# Patient Record
Sex: MALE | Race: WHITE
[De-identification: names, ages, dates, MRNs, and addresses within clinical notes are randomized per-mention and may not be internally consistent; named-entity substitution may affect disease eponyms.]

---

## 2019-11-24 ENCOUNTER — HOSPITAL ENCOUNTER (EMERGENCY)
Dept: HOSPITAL 97 - ER | Age: 69
Discharge: HOME | End: 2019-11-24
Payer: COMMERCIAL

## 2019-11-24 VITALS — DIASTOLIC BLOOD PRESSURE: 82 MMHG | SYSTOLIC BLOOD PRESSURE: 117 MMHG | OXYGEN SATURATION: 100 % | TEMPERATURE: 98.5 F

## 2019-11-24 DIAGNOSIS — Z79.82: ICD-10-CM

## 2019-11-24 DIAGNOSIS — Z23: ICD-10-CM
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
## 2019-11-24 DIAGNOSIS — I48.91: ICD-10-CM
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
## 2019-11-24 DIAGNOSIS — S61.011A: Primary | ICD-10-CM
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
## 2019-11-24 PROCEDURE — 99283 EMERGENCY DEPT VISIT LOW MDM: CPT

## 2019-11-24 PROCEDURE — 90471 IMMUNIZATION ADMIN: CPT

## 2019-11-24 PROCEDURE — 0JQJ0ZZ REPAIR RIGHT HAND SUBCUTANEOUS TISSUE AND FASCIA, OPEN APPROACH: ICD-10-PCS

## 2019-11-24 PROCEDURE — 90714 TD VACC NO PRESV 7 YRS+ IM: CPT

## 2019-11-24 NOTE — ER
Nurse's Notes                                                                                     

 [facility]                                                                 

Name: Ganesh Diehl                                                                              

Age: 69 yrs                                                                                       

Sex: Male                                                                                         

: 1950                                                                                   

MRN: [id number]                                                                                   

Arrival Date: 2019                                                                          

Time: 13:58                                                                                       

Account#: [id number]                                                                            

Bed 24                                                                                            

Private MD:                                                                                       

Diagnosis: Laceration without foreign body of right thumb without damage to nail                  

                                                                                                  

Presentation:                                                                                     

                                                                                             

13:58 Transition of care: patient was not received from another setting of care.              sv  

13:58 Method Of Arrival: Ambulatory                                                           sv  

14:00 Presenting complaint: Patient states: right thumb injury with a machine that he was     sv  

      trying to maneuver and his skin ripped off on the right thumb. Onset of symptoms was        

      2019. Risk Assessment: Do you want to hurt yourself or someone else?           

      Patient reports no desire to harm self or others. Care prior to arrival: None.              

14:00 Acuity: CHANO 4                                                                           sv  

14:10 Initial Sepsis Screen: Does the patient meet any 2 criteria? No. Patient's initial      tr5 

      sepsis screen is negative. Does the patient have a suspected source of infection? No.       

      Patient's initial sepsis screen is negative.                                                

                                                                                                  

Historical:                                                                                       

- Allergies:                                                                                      

14:03 No Known Allergies;                                                                     sv  

- Home Meds:                                                                                      

14:03 aspirin 81 mg Oral chew [Active];                                                       sv  

- PMHx:                                                                                           

14:03 atrial flutter;                                                                         sv  

                                                                                                  

- Immunization history:: Adult Immunizations unknown.                                             

- Social history:: Smoking status: unknown.                                                       

- Ebola Screening: : Patient negative for fever greater than or equal to 101.5 degrees            

  Fahrenheit, and additional compatible Ebola Virus Disease symptoms Patient denies               

  exposure to infectious person Patient denies travel to an Ebola-affected area in the            

  21 days before illness onset No symptoms or risks identified at this time.                      

                                                                                                  

                                                                                                  

Screenin:10 Abuse screen: Denies threats or abuse. Nutritional screening: No deficits noted.        tr5 

      Tuberculosis screening: No symptoms or risk factors identified. Fall Risk None              

      identified.                                                                                 

                                                                                                  

Assessment:                                                                                       

14:32 General: Appears in no apparent distress. Behavior is calm, cooperative, appropriate    tr5 

      for age. Pain: Complains of pain in palmar aspect of distal phalanx of right thumb.         

      Neuro: Level of Consciousness is awake, alert, obeys commands, Oriented to person,          

      place, time,  are equal bilaterally Moves all extremities. Cardiovascular: Heart       

      tones present Capillary refill < 3 seconds. Respiratory: Airway is patent Respiratory       

      effort is even, unlabored, Respiratory pattern is regular, symmetrical. GI: No signs        

      and/or symptoms were reported involving the gastrointestinal system. : No signs           

      and/or symptoms were reported regarding the genitourinary system. EENT: No signs and/or     

      symptoms were reported regarding the EENT system. Derm: No signs and/or symptoms            

      reported regarding the dermatologic system. Musculoskeletal: Injury to L thumb. Injury      

      Description: Laceration.                                                                    

                                                                                                  

Vital Signs:                                                                                      

14:04  / 82; Pulse 85; Resp 20; Temp 98.5; Pulse Ox 100% ; Weight 120.2 kg; Height 6    sv  

      ft. 1 in. (185.42 cm);                                                                      

14:04 Body Mass Index 34.96 (120.20 kg, 185.42 cm)                                            sv  

                                                                                                  

ED Course:                                                                                        

13:58 Patient arrived in ED.                                                                  mr  

13:58 Arm band placed on.                                                                     sv  

14:03 Triage completed.                                                                       sv  

14:08 Kobi Olivia MD is Attending Physician.                                              kdr 

14:10 Bed in low position. Call light in reach. Side rails up X 1.                            tr5 

14:25 Derrick Berry, RN is Primary Nurse.                                                 tr5 

15:30 Assist provider with laceration repair on palmar aspect of distal phalanx of right      iw  

      thumb that was between 2.6 to 7.5 cm using sutures. Set up tray. Performed by Khris PEÑA Dressed with 4X4s, Neosporin, Patient tolerated well.                            

15:51 No provider procedures requiring assistance completed. Patient did not have IV access   tr5 

      during this emergency room visit.                                                           

                                                                                                  

Administered Medications:                                                                         

14:31 Drug: Tetanus-Diphtheria Toxoid Adult 0.5 ml {: [employer]. Exp:         tr5 

      2021. Lot #: [id number]. } Route: IM; Site: left deltoid;                                  

15:54 Follow up: Response: No adverse reaction                                                iw  

14:55 Drug: Marcaine (0.5 %) 10 ml Volume: 10 ml; Route: Infiltration;                        iw  

                                                                                                  

                                                                                                  

Outcome:                                                                                          

15:30 Discharge ordered by MD.                                                                kdr 

15:51 Discharged to home ambulatory.                                                          tr5 

15:51 Condition: stable                                                                           

15:51 Discharge instructions given to patient, family, Instructed on discharge instructions,      

      follow up and referral plans. Demonstrated understanding of instructions, follow-up         

      care.                                                                                       

15:53 Patient left the ED.                                                                    iw  

                                                                                                  

Signatures:                                                                                       

Mary Ann Jackson RN                    RN   sv                                                   

Kobi Olivia MD MD   [facility]                                                  

[facility]                                 mr                                                   

Deedee Marquez RN RN   iw                                                   

Derrick Berry RN                   RN   tr5                                                  

                                                                                                  

Corrections: (The following items were deleted from the chart)                                    

14:05 14:04 Pulse 85bpm; Resp 20bpm; Pulse Ox 100%; Temp 98.5F; 120.2 kg; Height 6 ft. 1 in.; sv  

      BMI: 34.9; sv                                                                               

                                                                                                  

**************************************************************************************************

## 2019-11-24 NOTE — EDPHYS
Physician Documentation                                                                           

 [facility]                                                                 

Name: Ganesh Diehl                                                                              

Age: 69 yrs                                                                                       

Sex: Male                                                                                         

: 1950                                                                                   

MRN: [id number]                                                                                   

Arrival Date: 2019                                                                          

Time: 13:58                                                                                       

Account#: [id number]                                                                            

Bed 24                                                                                            

Private MD:                                                                                       

ED Physician Kobi Olivia                                                                       

HPI:                                                                                              

                                                                                             

17:50 This 69 yrs old  Male presents to ER via Ambulatory with complaints of Thumb   kdr 

      Injury.                                                                                     

17:50 The patient or guardian reports injury, a laceration, irregular, complex, flap, pain.   kdr 

      The complaints affect the right side which is dominant, IP of right thumb. Context: The     

      problem was sustained at home, resulted from a crush injury, by construction equipment,     

      Pinched - creating a flap that starts at the lateral nail gutter and extends to the         

      volar andrade surface of the thumb pad. Onset: The symptoms/episode began/occurred           

      suddenly, just prior to arrival. Modifying factors: The symptoms are alleviated by          

      nothing, the symptoms are aggravated by movement. Associated signs and symptoms: The        

      patient has no apparent associated signs or symptoms. Severity of symptoms: At their        

      worst the symptoms were mild, in the emergency department the symptoms are unchanged.       

      The patient has not experienced similar symptoms in the past. The patient has not           

      recently seen a physician.                                                                  

                                                                                                  

Historical:                                                                                       

- Allergies:                                                                                      

14:03 No Known Allergies;                                                                     sv  

- Home Meds:                                                                                      

14:03 aspirin 81 mg Oral chew [Active];                                                       sv  

- PMHx:                                                                                           

14:03 atrial flutter;                                                                         sv  

                                                                                                  

- Immunization history:: Adult Immunizations unknown.                                             

- Social history:: Smoking status: unknown.                                                       

- Ebola Screening: : Patient negative for fever greater than or equal to 101.5 degrees            

  Fahrenheit, and additional compatible Ebola Virus Disease symptoms Patient denies               

  exposure to infectious person Patient denies travel to an Ebola-affected area in the            

  21 days before illness onset No symptoms or risks identified at this time.                      

                                                                                                  

                                                                                                  

ROS:                                                                                              

17:50 Constitutional: Negative for fever, chills, and weight loss, Eyes: Negative for injury, kdr 

      pain, redness, and discharge, Neck: Negative for injury, pain, and swelling,                

      Cardiovascular: Negative for chest pain, palpitations, and edema, Respiratory: Negative     

      for shortness of breath, cough, wheezing, and pleuritic chest pain.                         

17:50 Skin: Positive for laceration(s).                                                           

                                                                                                  

Exam:                                                                                             

17:50 Constitutional:  This is a well developed, well nourished patient who is awake, alert,  kdr 

      and in no acute distress.                                                                   

17:50 Skin: injury, laceration(s), that can be described as jagged, There is a quater sized       

      flap to the index finger side of the thumb that extends down from the gutter of the         

      nail to the andrade surface of the thumb pad.                                                

                                                                                                  

Vital Signs:                                                                                      

14:04  / 82; Pulse 85; Resp 20; Temp 98.5; Pulse Ox 100% ; Weight 120.2 kg; Height 6    sv  

      ft. 1 in. (185.42 cm);                                                                      

14:04 Body Mass Index 34.96 (120.20 kg, 185.42 cm)                                            sv  

                                                                                                  

Laceration:                                                                                       

15:30 Wound Repair of 4cm ( 1.6in ) subcutaneous laceration to palmar aspect of distal        jmm 

      phalanx of right thumb. Distal neuro/vascular/tendon intact. Anesthesia: Digital block      

      administered with 5 mls of 0.5% marcaine. Wound prep: Moderate cleansing with betadine      

      by me. Skin closed with 9 5-0 Prolene using simple sutures and sterile technique.           

      Patient tolerated well.                                                                     

                                                                                                  

MDM:                                                                                              

15:30 Patient medically screened.                                                             kdr 

17:50 Data reviewed: vital signs, nurses notes. Counseling: I had a detailed discussion with  kdr 

      the patient and/or guardian regarding: the historical points, exam findings, and any        

      diagnostic results supporting the discharge/admit diagnosis, the need for outpatient        

      follow up.                                                                                  

                                                                                                  

Administered Medications:                                                                         

14:31 Drug: Tetanus-Diphtheria Toxoid Adult 0.5 ml {: [employer]. Exp:         tr5 

      2021. Lot #: [id number]. } Route: IM; Site: left deltoid;                                  

15:54 Follow up: Response: No adverse reaction                                                iw  

14:55 Drug: Marcaine (0.5 %) 10 ml Volume: 10 ml; Route: Infiltration;                        iw  

                                                                                                  

                                                                                                  

Disposition:                                                                                      

17:50 Co-signature as Attending Physician, Kobi Olivia MD I agree with the assessment and   kdr 

      plan of care.                                                                               

                                                                                                  

Disposition:                                                                                      

19 15:30 Discharged to Home. Impression: Laceration without foreign body of right thumb     

  without damage to nail.                                                                         

- Condition is Stable.                                                                            

- Discharge Instructions: VIS, Diphtheria, Tetanus, and Pertussis (DTaP) - CDC,                   

  Laceration Care, Adult, Easy-to-Read, Sutured Wound Care, Easy-to-Read.                         

- Prescriptions for Keflex 500 mg Oral Capsule - take 1 capsule by ORAL route every 8             

  hours for 3 days; 6 capsule. Tramadol 50 mg Oral Tablet - take 1 tablet by ORAL route           

  every 8 hours as needed; 12 tablet.                                                             

- Medication Reconciliation Form, Thank You Letter, Antibiotic Education, Prescription            

  Opioid Use form.                                                                                

- Follow up: Private Physician; When: 2 - 3 days; Reason: Wound Recheck, If symptoms              

  return, Further diagnostic work-up, Recheck today's complaints, Continuance of care,            

  Re-evaluation by your physician.                                                                

- Problem is new.                                                                                 

- Symptoms have improved.                                                                         

- Notes: Sutures should be taken out in 10 - 12 days. You had nine sutures plance in              

  medial aspect of the right distal thumb. You also received a tetanus update.                    

                                                                                                  

                                                                                                  

Signatures:                                                                                       

Mary Ann Jackson, RN                    RN   Kobi Oswald MD MD kdr Mickail, Joel, PA PA jmm Williams, Irene, RN RN   iw                                                   

Derrick Berry RN                   RN   tr5                                                  

                                                                                                  

Corrections: (The following items were deleted from the chart)                                    

15:53 15:30 2019 15:30 Discharged to Home. Impression: Laceration without foreign body  iw  

      of right thumb without damage to nail. Condition is Stable. Forms are Medication            

      Reconciliation Form, Thank You Letter, Antibiotic Education, Prescription Opioid Use.       

      Follow up: Private Physician; When: 2 - 3 days; Reason: Wound Recheck, If symptoms          

      return, Further diagnostic work-up, Recheck today's complaints, Continuance of care,        

      Re-evaluation by your physician. Problem is new. Symptoms have improved. kdr                

                                                                                                  

**************************************************************************************************

## 2019-12-23 ENCOUNTER — HOSPITAL ENCOUNTER (EMERGENCY)
Dept: HOSPITAL 97 - ER | Age: 69
Discharge: HOME | End: 2019-12-23
Payer: COMMERCIAL

## 2019-12-23 VITALS — DIASTOLIC BLOOD PRESSURE: 100 MMHG | SYSTOLIC BLOOD PRESSURE: 165 MMHG | OXYGEN SATURATION: 97 % | TEMPERATURE: 97.4 F

## 2019-12-23 DIAGNOSIS — Z98.890: ICD-10-CM

## 2019-12-23 DIAGNOSIS — I48.91: ICD-10-CM

## 2019-12-23 DIAGNOSIS — N30.01: Primary | ICD-10-CM

## 2019-12-23 PROCEDURE — 99284 EMERGENCY DEPT VISIT MOD MDM: CPT

## 2019-12-23 PROCEDURE — 81003 URINALYSIS AUTO W/O SCOPE: CPT

## 2019-12-23 PROCEDURE — 51702 INSERT TEMP BLADDER CATH: CPT

## 2019-12-23 NOTE — EDPHYS
Physician Documentation                                                                           

 [facility]                                                                 

Name: Ganesh Diehl                                                                              

Age: 69 yrs                                                                                       

Sex: Male                                                                                         

: 1950                                                                                   

MRN: [id number]                                                                                   

Arrival Date: 2019                                                                          

Time: 04:36                                                                                       

Account#: [id number]                                                                            

Bed 7                                                                                             

Private MD:                                                                                       

ED Physician Duane Vargas                                                                      

HPI:                                                                                              

                                                                                             

05:25 This 69 yrs old  Male presents to ER via Ambulatory with complaints of Urinary ps1 

      Retention.                                                                                  

05:25 patient s/p urethral lift procedure in [place]. Patient has had normal course of        ps1 

      healing for last couple of days. Patient states that he has not been able to urinate        

      since yesterday afternoon and now has pain in lower abdomen. Pain is moderate. No           

      fever. .                                                                                    

                                                                                                  

Historical:                                                                                       

- Allergies:                                                                                      

04:51 No Known Allergies;                                                                     ea  

- Home Meds:                                                                                      

04:51 metoprolol tartrate Oral [Active];                                                      ea  

- PMHx:                                                                                           

04:51 atrial flutter;                                                                         ea  

- PSHx:                                                                                           

04:51 None;                                                                                   ea  

                                                                                                  

- Immunization history:: Adult Immunizations up to date.                                          

- Social history:: Smoking status: Patient/guardian denies using tobacco.                         

- Ebola Screening: : No symptoms or risks identified at this time.                                

                                                                                                  

                                                                                                  

ROS:                                                                                              

05:25 Constitutional: Negative for fever, chills, and weight loss, Eyes: Negative for injury, ps1 

      pain, redness, and discharge, Cardiovascular: Negative for chest pain, palpitations,        

      and edema, Respiratory: Negative for shortness of breath, cough, wheezing, and              

      pleuritic chest pain, Abdomen/GI: Negative for abdominal pain, nausea, vomiting,            

      diarrhea, and constipation, MS/Extremity: Negative for injury and deformity, Skin:          

      Negative for injury, rash, and discoloration, Neuro: Negative for headache, weakness,       

      numbness, tingling, and seizure.                                                            

05:25 : Positive for small amounts, hematuria.                                                  

                                                                                                  

Exam:                                                                                             

05:25 Constitutional:  This is a well developed, well nourished patient who is awake, alert,  ps1 

      and in no acute distress. Head/Face:  Normocephalic, atraumatic. Eyes:  Pupils equal        

      round and reactive to light, extra-ocular motions intact.  Lids and lashes normal.          

      Conjunctiva and sclera are non-icteric and not injected. Chest/axilla:  Normal chest        

      wall appearance and motion.  Nontender with no deformity.  No lesions are appreciated.      

      Cardiovascular:  Regular rate and rhythm.  No gallops, murmurs, or rubs.  Normal PMI,       

      no JVD.  No pulse deficits. Respiratory:  Lungs have equal breath sounds bilaterally,       

      clear to auscultation and percussion.  No rales, rhonchi or wheezes noted.  No              

      increased work of breathing, no retractions or nasal flaring. Abdomen/GI:  Soft,            

      non-tender, with normal bowel sounds.  No distension or tympany.  No guarding or            

      rebound.  No evidence of tenderness throughout. Skin:  Warm, dry with normal turgor.        

      Normal color with no rashes, no lesions, and no evidence of cellulitis. MS/ Extremity:      

      Pulses equal, no cyanosis.  Neurovascular intact.  Full, normal range of motion.            

05:25 : CVA tenderness, is absent, Bladder: distension, that is severe, a abraham is noted,       

      urine is blood tinged, clots, Placed after bladder scan 940mL.                              

                                                                                                  

Vital Signs:                                                                                      

04:48  / 100; Pulse 72; Resp 18; Temp 97.4; Pulse Ox 97% on R/A; Weight 120.2 kg;       ea  

      Height 6 ft. 1 in. (185.42 cm);                                                             

04:48 Body Mass Index 34.96 (120.20 kg, 185.42 cm)                                            ea  

                                                                                                  

MDM:                                                                                              

04:54 Patient medically screened.                                                             ps1 

05:34 Data reviewed: vital signs, nurses notes, lab test result(s), and as a result, I will   ps1 

      discharge patient. Counseling: I had a detailed discussion with the patient and/or          

      guardian regarding: the historical points, exam findings, and any diagnostic results        

      supporting the discharge/admit diagnosis, lab results, the need for outpatient follow       

      up, a urologist, to return to the emergency department if symptoms worsen or persist or     

      if there are any questions or concerns that arise at home.                                  

                                                                                                  

                                                                                             

05:31 Order name: Urine Dipstick--Ancillary (enter results)                                   mw2 

                                                                                             

05:03 Order name: Abraham; Complete Time: 05:03                                                 ao  

                                                                                             

05:25 Order name: Urine Dipstick-Ancillary (obtain specimen); Complete Time: 05:46            ps1 

                                                                                                  

Administered Medications:                                                                         

No medications were administered                                                                  

                                                                                                  

                                                                                                  

Disposition:                                                                                      

19 05:34 Discharged to Home. Impression: Acute cystitis with hematuria, Acute urinary       

  retention.                                                                                      

- Condition is Stable.                                                                            

- Discharge Instructions: Abraham Catheter Care, Adult, Urinary Tract Infection, Adult.             

- Prescriptions for Keflex 500 mg Oral Capsule - take 1 capsule by ORAL route every 8             

  hours for 10 days; 30 capsule.                                                                  

- Medication Reconciliation Form, Thank You Letter, Antibiotic Education, Prescription            

  Opioid Use form.                                                                                

- Follow up: Private Physician; When: Upon discharge from the Emergency Department;               

  Reason: Further diagnostic work-up, Recheck today's complaints, Continuance of care,            

  Re-evaluation by your physician. Follow up: Emergency Department; When: As needed;              

  Reason: Fever > 102 F, Worsening of condition.                                                  

                                                                                                  

                                                                                                  

                                                                                                  

Signatures:                                                                                       

Dispatcher [employer]                           EDMS                                                 

Félix Lainez RN                         RN   Sasha Lancaster RN                      RN   Duane Ivey MD MD   ps1                                                  

                                                                                                  

Corrections: (The following items were deleted from the chart)                                    

05:47 05:34 2019 05:34 Discharged to Home. Impression: Acute cystitis with hematuria;   ea  

      Acute urinary retention. Condition is Stable. Forms are Medication Reconciliation Form,     

      Thank You Letter, Antibiotic Education, Prescription Opioid Use. Follow up: Private         

      Physician; When: Upon discharge from the Emergency Department; Reason: Further              

      diagnostic work-up, Recheck today's complaints, Continuance of care, Re-evaluation by       

      your physician. Follow up: Emergency Department; When: As needed; Reason: Fever > 102       

      F, Worsening of condition. ps1                                                              

                                                                                                  

**************************************************************************************************

## 2019-12-23 NOTE — ER
Nurse's Notes                                                                                     

 [facility]                                                                 

Name: Ganesh Diehl                                                                              

Age: 69 yrs                                                                                       

Sex: Male                                                                                         

: 1950                                                                                   

MRN: [id number]                                                                                   

Arrival Date: 2019                                                                          

Time: 04:36                                                                                       

Account#: [id number]                                                                            

Bed 7                                                                                             

Private MD:                                                                                       

Diagnosis: Acute cystitis with hematuria;Acute urinary retention                                  

                                                                                                  

Presentation:                                                                                     

                                                                                             

04:46 Presenting complaint: Patient states: Reports he is unable to urinate since 4 PM        ea  

      yesterday, states "Wednesday I had a ureter lift, everything went fine and yesterday I      

      couldn't urinate" Pt reports bright red blood coming from urethra. Transition of care:      

      patient was not received from another setting of care. Onset of symptoms was 2019. Risk Assessment: Do you want to hurt yourself or someone else? Patient            

      reports no desire to harm self or others. Initial Sepsis Screen: Does the patient meet      

      any 2 criteria? No. Patient's initial sepsis screen is negative. Does the patient have      

      a suspected source of infection? No. Patient's initial sepsis screen is negative. Care      

      prior to arrival: None.                                                                     

04:46 Method Of Arrival: Ambulatory                                                           ea  

04:46 Acuity: CHANO 4                                                                           ea  

                                                                                                  

Triage Assessment:                                                                                

04:51 General: Appears in no apparent distress. Behavior is cooperative, restless. Pain:      ea  

      Denies pain. Neuro: Level of Consciousness is awake, alert, obeys commands, Oriented to     

      person, place, time, situation. Cardiovascular: Patient's skin is warm and dry.             

      Respiratory: Airway is patent Respiratory effort is even, unlabored, Respiratory            

      pattern is regular, symmetrical. Derm: Skin is pink, warm \T\ dry.                          

                                                                                                  

Historical:                                                                                       

- Allergies:                                                                                      

04:51 No Known Allergies;                                                                     ea  

- Home Meds:                                                                                      

04:51 metoprolol tartrate Oral [Active];                                                      ea  

- PMHx:                                                                                           

04:51 atrial flutter;                                                                         ea  

- PSHx:                                                                                           

04:51 None;                                                                                   ea  

                                                                                                  

- Immunization history:: Adult Immunizations up to date.                                          

- Social history:: Smoking status: Patient/guardian denies using tobacco.                         

- Ebola Screening: : No symptoms or risks identified at this time.                                

                                                                                                  

                                                                                                  

Screenin:49 Abuse screen: Denies threats or abuse. Nutritional screening: No deficits noted.        ea  

      Tuberculosis screening: No symptoms or risk factors identified. Fall Risk None              

      identified.                                                                                 

                                                                                                  

Assessment:                                                                                       

04:50 General: Appears in no apparent distress. uncomfortable, obese, Behavior is calm,       ao  

      cooperative, appropriate for age. Pain: Complains of pain in abdomen. Neuro: Level of       

      Consciousness is awake, alert, obeys commands, Oriented to person, place, time,             

      situation, Appropriate for age Moves all extremities. Full function Gait is steady,         

      Speech is normal. Cardiovascular: Capillary refill < 3 seconds Patient's skin is warm       

      and dry. Respiratory: Airway is patent Respiratory effort is even, unlabored,               

      Respiratory pattern is regular, symmetrical. GI: No signs and/or symptoms were reported     

      involving the gastrointestinal system. : Reports inability to void, since 12 PTA pain     

      in suprapubic area. EENT: No signs and/or symptoms were reported regarding the EENT         

      system. Derm: No signs and/or symptoms reported regarding the dermatologic system.          

      Musculoskeletal: No signs and/or symptoms reported regarding the musculoskeletal system.    

05:46 Reassessment: Patient and/or family updated on plan of care and expected duration. Pain ea  

      level reassessed. Patient is alert, oriented x 3, equal unlabored respirations, skin        

      warm/dry/pink. Discharge instruction given to patient, verbalized the understanding of      

      instruction. Pt left ED ambulatory accompanied by family. Pt tolerating well.               

                                                                                                  

Vital Signs:                                                                                      

04:48  / 100; Pulse 72; Resp 18; Temp 97.4; Pulse Ox 97% on R/A; Weight 120.2 kg;       ea  

      Height 6 ft. 1 in. (185.42 cm);                                                             

04:48 Body Mass Index 34.96 (120.20 kg, 185.42 cm)                                            ea  

                                                                                                  

ED Course:                                                                                        

04:36 Patient arrived in ED.                                                                  ag3 

04:43 Duane Vargas MD is Attending Physician.                                             ps1 

04:45 Sasha Rodrigues, RN is Primary Nurse.                                                    ea  

04:48 Triage completed.                                                                       ea  

04:49 Patient has correct armband on for positive identification. Bed in low position. Call   ea  

      light in reach. Side rails up X2.                                                           

04:49 Arm band placed on right wrist. Patient placed in an exam room, on a stretcher, on      ea  

      pulse oximetry.                                                                             

05:03 Pratt cath inserted, using sterile technique, 16 Fr., by me, balloon inflated, to       ao  

      gravity drainage, clamped. other Patient output 1000. Pratt Clamped.                        

05:46 No provider procedures requiring assistance completed. Patient did not have IV access   ea  

      during this emergency room visit.                                                           

                                                                                                  

Administered Medications:                                                                         

No medications were administered                                                                  

                                                                                                  

                                                                                                  

Outcome:                                                                                          

05:34 Discharge ordered by MD.                                                                ps1 

05:47 Discharged to home ambulatory, with family.                                             ea  

05:47 Condition: stable                                                                           

05:47 Discharge instructions given to patient, Instructed on discharge instructions, follow       

      up and referral plans. medication usage, Demonstrated understanding of instructions,        

      follow-up care, medications, Prescriptions given X 1.                                       

05:47 Patient left the ED.                                                                    ea  

                                                                                                  

Signatures:                                                                                       

Félix Lainez RN                         Sasha Merrill RN RN ea Singer, Phillip, MD MD   ps1                                                  

Last, Elvi                                 ag3                                                  

                                                                                                  

Corrections: (The following items were deleted from the chart)                                    

04:52 04:51 General: Appears in no apparent distress. Behavior is calm, cooperative,          ea  

      appropriate for age, ea                                                                     

                                                                                                  

**************************************************************************************************

## 2019-12-23 NOTE — XMS REPORT
Summary of Care

 Created on:2019



Patient:Ganesh Diehl

Sex:Male

:1950

External Reference #:[id number]





Demographics







 Address  [address] [place] [address] 



   [place] [address]

 

 Mobile Phone  [phone]

 

 Home Phone  [phone]

 

 Preferred Language  English

 

 Marital Status  

 

 Sikhism Affiliation  Unknown

 

 Race  White

 

 Ethnic Group  Not  or 









Author







 Organization  [facility]

 

 Address  [address] [id number]









Support







 Name  Relationship  Address  Phone

 

 Alessandro Diehl  Extended family member  [address] [place] [address] [address]  [phone]



     [place] [address]  









Care Team Providers







 Name  Role  Phone

 

 Unavailable  Primary Care Provider  Unavailable









Reason for Visit







 Reason  Comments

 

 Medical Concern  







Encounter Details







 Date  Type  Department  Care Team  Description

 

 2019  Office Visit  [facility]  Leandro Porter MD



  Medical Concern



     Medicine Urology



  [address] [place] [address]



  



     [address] [place] [address]



  [address], [address]



  



     10th Floor, [address]



  [place] [address]



  



     [place] [address] [phone] (Fax)  







Allergies

No Known Allergiesdocumented as of this encounter (statuses as of 2019)



Medications







 Medication  Sig  Dispensed  Refills  Start Date  End Date  Status

 

 ASPIRIN 81 OR  aspirin 81 mg tablet,delayed release    0      Active



    Take 1 tablet every day by oral route.          

 

 Multiple  Centrum Silver Ultra Men's 300 mcg-600 mcg-300 mcg tablet    0      
Active



 Vitamins-Minerals   Take 1 tablet every day by oral route.          



 (CENTRUM SILVER            



 50+MEN OR)            

 

 metoprolol  TAKE 1 TABLET BY    0  2019    Active



 (TOPROL-XL) 25 MG XL  MOUTH ONCE DAILY          



 tablet            

 

 terazosin (HYTRIN) 5  terazosin 5 mg    0      Active



 MG capsule  capsule          

 

 B Complex Vitamins (B  Take  by mouth.    0      Active



 COMPLEX 100 OR)            

 

 Ascorbic Acid  Take  by mouth.    0      Active



 (VITAMIN C) 500 MG            



 CAPS            



documented as of this encounter (statuses as of 2019)



Active Problems

Not on filedocumented as of this encounter (statuses as of 2019)



Social History







 Tobacco Use  Types  Packs/Day  Years Used  Date

 

 Never Smoker        









 Smokeless Tobacco: Never Used      









 Alcohol Use  Drinks/Week  oz/Week  Comments

 

 Not Currently      









 Sex Assigned at Birth  Date Recorded

 

 Not on file  









 Job Start Date  Occupation  Industry

 

 Not on file  Not on file  Not on file









 Travel History  Travel Start  Travel End









 No recent travel history available.



documented as of this encounter



Last Filed Vital Signs







 Vital Sign  Reading  Time Taken  Comments

 

 Blood Pressure  122/85  2019 11:42 AM CDT  

 

 Pulse  63  2019 11:42 AM CDT  

 

 Temperature  36.6 C (97.8 F)  2019 11:42 AM CDT  

 

 Respiratory Rate  -  -  

 

 Oxygen Saturation  -  -  

 

 Inhaled Oxygen Concentration  -  -  

 

 Weight  126.1 kg (278 lb)  2019 11:42 AM CDT  

 

 Height  185.4 cm (6' 1")  2019 11:42 AM CDT  

 

 Body Mass Index  36.68  2019 11:42 AM CDT  



documented in this encounter



Progress Notes

Leandro Porter MD - 2019 12:00 PM CDTFormatting of this note might be 
different from the original.

Referring Physician

Self Referral

No address on file



Patient Name: Ganesh Diehl

:1950

[facility] ID#:[id number]



Mr. Diehl is a 69 y.o. year old male who present to me for BPH and lower 
urinary tract symptoms for the past 5 years.



He is currently  being treated for his BPH.  He is taking Terazosin

He has not had any prior procedures to treat his BPH.

  He has no family history of prostate cancer.



 denies hematuria, dysuria.



He has urinary urgency or frequency and states has a decreased urinary stream.  
He has nocturia x4.



Past Medical History:

Diagnosis Date

 Family history of prostate problems

 Irregular heart beat





Past Surgical History:

Procedure Laterality Date

 HX VENTRICULAR ABLATION SURGERY





Medications:



Current Outpatient Medications:

  Ascorbic Acid (VITAMIN C) 500 MG CAPS, Take  by mouth., Disp: , Rfl:

  ASPIRIN 81 OR, aspirin 81 mg tablet,delayed release  Take 1 tablet every 
day by oral route., Disp: , Rfl:

  B Complex Vitamins (B COMPLEX 100 OR), Take  by mouth., Disp: , Rfl:

  metoprolol (TOPROL-XL) 25 MG XL tablet, TAKE 1 TABLET BY MOUTH ONCE DAILY, 
Disp: , Rfl: 0

  Multiple Vitamins-Minerals (CENTRUM SILVER 50+MEN OR), Centrum Silver 
Ultra Men's 300 mcg-600 mcg-300 mcg tablet  Take 1 tablet every day by oral 
route., Disp: , Rfl:

  terazosin (HYTRIN) 5 MG capsule, terazosin 5 mg capsule, Disp: , Rfl:

Outpatient Medications Prior to Visit

Medication Sig Dispense Refill

 Vitamin C Take  by mouth.

 ASPIRIN 81 OR aspirin 81 mg tablet,delayed release

 Take 1 tablet every day by oral route.

 B Complex Vitamins (B COMPLEX 100 OR) Take  by mouth.

 metoprolol TAKE 1 TABLET BY MOUTH ONCE DAILY  0

 Multiple Vitamins-Minerals (CENTRUM SILVER 50+MEN OR) Centrum Silver Ultra 
Men's 300 mcg-600 mcg-300 mcg tablet

 Take 1 tablet every day by oral route.

 terazosin terazosin 5 mg capsule



No facility-administered medications prior to visit.





Social History

  Socioeconomic History

    Marital status: 

    Spouse name: Not on file

    Number of children: Not on file

    Years of education: Not on file

    Highest education level: Not on file

  Occupational History

    Not on file

  Social Needs

    Financial resource strain: Not on file

    Food insecurity:

      Worry: Not on file

      Inability: Not on file

    Transportation needs:

      Medical: Not on file

      Non-medical: Not on file

  Tobacco Use

    Smoking status: Never Smoker

    Smokeless tobacco: Never Used

  Substance and Sexual Activity

    Alcohol use: Not Currently

    Drug use: Never

    Sexual activity: Yes

  Lifestyle

    Physical activity:

      Days per week: Not on file

      Minutes per session: Not on file

    Stress: Not on file

  Relationships

    Social connections:

      Talks on phone: Not on file

      Gets together: Not on file

      Attends Yarsanism service: Not on file

      Active member of club or organization: Not on file

      Attends meetings of clubs or organizations: Not on file

      Relationship status: Not on file

    Intimate partner violence:

      Fear of current or ex partner: Not on file

      Emotionally abused: Not on file

      Physically abused: Not on file

      Forced sexual activity: Not on file

  Other Topics

    Concerns:

      Not on file

  Social History Narrative

    Not on file





family history is not on file.



No Known Allergies



Review of Systems:



GENERAL:

Denies recent weight changes, fever



INTEGUMENTARY:

Denies rashes, eruptions, dryness, jaundice, changes in skin, hair, or nails, 
discoloration of skin.



EYES:

Denies blurred vision, double vision, pain



EARS, NOSE, MOUTH AND THROAT:

Denies soreness and/or redness of gums, hoarseness, difficulty in swallowing, 
head colds, discharges, obstruction, postnasal drip, sinus pain, earaches.



MUSCULOSKELATAL:

Denies joint pain, neck pain, back pain



RESPIRATORY:

Denies chest pain, wheezing, cough, difficulty breathing, asthma, bronchitis, 
pneumonia, tuberculosis, shortness of breath, emphysema



NEUROLOGIC:

Denies fainting, blackouts, seizures, paralysis, tingling, tremors, memory loss
, dizzy spells, stroke



CARDIOVASCULAR:

CAD



ENDOCRINE:

Denies thyroid trouble, heat and cold intolerance, excessive sweating, thirst, 
hunger



GASTROINTESTINAL:

Denies nausea, vomiting, diarrhea, constipation, indigestion, food intolerance, 
hemorrhoids, jaundice, heartburn, diabetes, hepatitis



GENITOURINARY:

As per HPI



HEMATOLOGIC/LYMPHATIC:

Denies anemia, easy bruising or bleeding, past transfusion, swollen glands, 
blood clotting problems



PSYCHOLOGIC:

Denies nervousness, mood swings, insomnia, headaches, nightmares, depression



ALLERGY/IMMUNOLOGIC:

Denies food allergies, plant allergies, environmental allergies



OTHER:

Denies HIV/AIDS



Physical Exam:



Vitals:

 19 1142

BP: 122/85

Pulse: 63

Temp: 97.8 F (36.6 C)

Weight: 278 lb (126.1 kg)

Height: 6' 1" (1.854 m)



GENERAL:

    Well developed, well nourished, in no acute distress

HEAD:

    Normocephalic and atraumatic



RECTAL:

    Normal external exam  40 gram prostate without asymmetry, nodules, or 
induration, no tenderness noted, good anal sphincter tone



EXTREMITIES:

    No clubbing, cyanosis, edema, or deformity

SKIN:

    Intact without lesions or rashes

NEURO:

    GARCIA well.  Patient alert and oriented x3.

PSYCH:

    Alert and cooperative; normal mood and affect; normal attention span and 
concentration







Assessment:

BPH

LUTS



Plan:

I discussed conservative management of LUTS with lifestyle modification and 
decreasing fluid intake after 6pm.  I also discussed the natural progression of 
BPH and possible treatment strategies.



Post void residual

Flow rate

ua with micro

I have given him a list of bladder irritating foods to avoid



Uroxatral rx



I discussed the side effects of the medications and I encouraged him to read 
the package insert and patient information of the medication.



FU in 2 months for TRUS and cysto for urolift work up



Electronically signed by Leandro Porter MD at 2019 12:31 PM CDTLeandro Porter MD - 2019 12:00 PM CDTElectronically signed by Leandro Porter MD at  12:31 PM CDTBMary Ann price - 2019 11:49 AM CDT

HPI



Review of Systems

Constitutional: Negative.

HENT: Negative.

Eyes: Negative.

Respiratory: Negative.

Cardiovascular: Negative.

Gastrointestinal: Negative.

Endocrine: Negative.

Genitourinary: Positive for decreased urine volume.

Musculoskeletal: Negative.

Skin: Negative.

Allergic/Immunologic: Negative.

Neurological: Negative.

Hematological: Negative.

Psychiatric/Behavioral: Negative.

All other systems reviewed and are negative.



Physical Exam



Electronically signed by Mary Ann Goyal at 2019 12:31 PM 
CDTdocumented in this encounter



Plan of Treatment







 Health Maintenance  Due Date  Last Done  Comments

 

 COLON CANCER SCREENING: COLONOSCOPY  1950    

 

 MEDICARE AWV  1950    

 

 TETANUS SHOT (ADULT)  1964    

 

 HEPATITIS C SCREENING  1967    

 

 FALL SCREEN  2014    

 

 PNEUMOVAX >=65 (PPSV23)  2014    

 

 PREVNAR >=65 (PCV13)  2014    

 

 FLU VACCINE > 6 MONTHS  2019    



documented as of this encounter



Results

Not on filedocumented in this encounter



Visit Diagnoses







 Diagnosis

 

 Benign prostatic hyperplasia with lower urinary tract symptoms, symptom details



 unspecified - Primary



documented in this encounter



Insurance







 Payer  Benefit Plan /  Subscriber ID  Effective  Phone  Address  Type



   Group    Dates      

 

 MEDICARE  MEDICARE PART  xxxxxxxxxx  2018-Prese    [address]  Medicare



   [address] -    [id number]    [id number]



  



   [place]  



           [address]  

 

 [facility]  xxxxxxxxxx  Effective for  [phone]  [address]  
Medicare



   - [facility] SUPP    all dates  [phone]  [phone]



  



           [place]  



           [address]  









 Guarantor Name  Account Type  Relation to  Date of  Phone  Billing



     Patient  Birth    Address

 

 Ganesh Diehl  Personal/Family  Self  1950  [phone]  [address] [place] [address]        (Home)  [address] [place] [address]



           [id number]



documented as of this encounter

## 2019-12-23 NOTE — XMS REPORT
Patient Summary Document

 Created on:2019



Patient:AUREA MALDONADO

Sex:Male

:1950

External Reference #:[id number]





Demographics







 Address  [address] [place] [address] [address]



   [place] [address]

 

 Home Phone  [phone]

 

 Email Address  NONE

 

 Preferred Language  Unknown

 

 Marital Status  Unknown

 

 Islam Affiliation  Unknown

 

 Race  Unknown

 

 Additional Race(s)  Unavailable

 

 Ethnic Group  Unknown









Author







 Organization  [facility]

 

 Address  [address] [place] [address] [address]



   [place] [address]

 

 Phone  [phone]









Care Team Providers







 Name  Role  Phone

 

 ANNETTE MEADOWS  Unavailable  Unavailable









Problems

This patient has no known problems.



Allergies, Adverse Reactions, Alerts

This patient has no known allergies or adverse reactions.



Medications

This patient has no known medications.



Results







 Test Description  Test Time  Test Comments  Text Results  Atomic Results  
Result Comments









 BASIC METABOLIC PANEL  2018 10:09:00    









   Test Item  Value  Reference Range  Comments









 SODIUM (BEAKER) (test  141 meq/L  136-145  



 hder=801)      

 

 POTASSIUM (BEAKER) (test  4.5 meq/L  3.5-5.1  



 code=379)      

 

 CHLORIDE (BEAKER) (test  108 meq/L    



 code=382)      

 

 CO2 (BEAKER) (test code=355)  23 meq/L  22-29  

 

 BLOOD UREA NITROGEN (BEAKER)  18 mg/dL  7-21  



 (test code=354)      

 

 CREATININE (BEAKER) (test  0.85 mg/dL  0.57-1.25  



 code=358)      

 

 GLUCOSE RANDOM (BEAKER)  107 mg/dL    



 (test code=652)      

 

 CALCIUM (BEAKER) (test  9.4 mg/dL  8.4-10.2  



 code=697)      

 

 EGFR (BEAKER) (test  89 mL/min/1.73 sq m    ESTIMATED GFR IS NOT AS



 code=1092)      ACCURATE AS CREATININE



       CLEARANCE IN PREDICTING



       GLOMERULAR FILTRATION RATE.



       ESTIMATED GFR IS NOT



       APPLICABLE FOR DIALYSIS



       PATIENTS.



PROTHROMBIN TIME/[id number] 10:02:00





 Test Item  Value  Reference Range  Comments

 

 PROTIME (BEAKER) (test code=759)  14.4 seconds  11.7-14.7  

 

 INR (BEAKER) (test code=370)  1.1  <=5.9  



RECOMMENDED COUMADIN/WARFARIN INR THERAPY RANGESSTANDARD DOSE: 2.0 - 3.0   
Includes: PROPHYLAXIS forvenous thrombosis, systemic embolization; TREATMENT 
for venous thrombosis and/or pulmonary embolus.HIGH RISK: Target INR is 2.5-3.5 
for patients with mechanical heart valves.Within 24 hours, if on CoumadinCBC W/
PLT COUNT &amp; AUTO [id number] 09:53:00





 Test Item  Value  Reference Range  Comments

 

 WHITE BLOOD CELL COUNT (BEAKER) (test code=775)  5.9 K/ L  3.5-10.5  

 

 RED BLOOD CELL COUNT (BEAKER) (test code=761)  4.29 M/ L  4.63-6.08  

 

 HEMOGLOBIN (BEAKER) (test code=410)  14.3 GM/DL  13.7-17.5  

 

 HEMATOCRIT (BEAKER) (test code=411)  41.9 %  40.1-51.0  

 

 MEAN CORPUSCULAR VOLUME (BEAKER) (test code=753)  97.7 fL  79.0-92.2  

 

 MEAN CORPUSCULAR HEMOGLOBIN (BEAKER) (test  33.3 pg  25.7-32.2  



 ywss=124)      

 

 MEAN CORPUSCULAR HEMOGLOBIN CONC (BEAKER) (test  34.1 GM/DL  32.3-36.5  



 code=752)      

 

 RED CELL DISTRIBUTION WIDTH (BEAKER) (test  12.6 %  11.6-14.4  



 code=412)      

 

 PLATELET COUNT (BEAKER) (test code=756)  177 K/CU MM  150-450  

 

 MEAN PLATELET VOLUME (BEAKER) (test code=754)  10.4 fL  9.4-12.4  

 

 NUCLEATED RED BLOOD CELLS (BEAKER) (test  0 /100 WBC  0-0  



 code=413)      

 

 NEUTROPHILS RELATIVE PERCENT (BEAKER) (test  55 %    



 code=429)      

 

 LYMPHOCYTES RELATIVE PERCENT (BEAKER) (test  30 %    



 code=430)      

 

 MONOCYTES RELATIVE PERCENT (BEAKER) (test  10 %    



 code=431)      

 

 EOSINOPHILS RELATIVE PERCENT (BEAKER) (test  5 %    



 code=432)      

 

 BASOPHILS RELATIVE PERCENT (BEAKER) (test  1 %    



 code=437)      

 

 NEUTROPHILS ABSOLUTE COUNT (BEAKER) (test  3.25 K/ L  1.78-5.38  



 code=670)      

 

 LYMPHOCYTES ABSOLUTE COUNT (BEAKER) (test  1.76 K/ L  1.32-3.57  



 code=414)      

 

 MONOCYTES ABSOLUTE COUNT (BEAKER) (test  0.59 K/ L  0.30-0.82  



 code=415)      

 

 EOSINOPHILS ABSOLUTE COUNT (BEAKER) (test  0.27 K/ L  0.04-0.54  



 code=416)      

 

 BASOPHILS ABSOLUTE COUNT (BEAKER) (test  0.05 K/ L  0.01-0.08  



 code=417)      

 

 IMMATURE GRANULOCYTES-RELATIVE PERCENT (BEAKER)  0 %  0-1  



 (test code=2801)      



BASIC METABOLIC [id number] 09:44:00





 Test Item  Value  Reference Range  Comments

 

 SODIUM (BEAKER) (test  137 meq/L  136-145  



 eubo=176)      

 

 POTASSIUM (BEAKER) (test  4.3 meq/L  3.5-5.1  



 code=379)      

 

 CHLORIDE (BEAKER) (test  104 meq/L    



 code=382)      

 

 CO2 (BEAKER) (test  26 meq/L  22-29  



 code=355)      

 

 BLOOD UREA NITROGEN  17 mg/dL  7-21  



 (BEAKER) (test code=354)      

 

 CREATININE (BEAKER) (test  1.02 mg/dL  0.57-1.25  



 code=358)      

 

 GLUCOSE RANDOM (BEAKER)  98 mg/dL    



 (test code=652)      

 

 CALCIUM (BEAKER) (test  9.1 mg/dL  8.4-10.2  



 code=697)      

 

 EGFR (BEAKER) (test  73 mL/min/1.73 sq m    ESTIMATED GFR IS NOT AS



 code=1092)      ACCURATE AS CREATININE



       CLEARANCE IN PREDICTING



       GLOMERULAR FILTRATION



       RATE. ESTIMATED GFR IS



       NOT APPLICABLE FOR



       DIALYSIS PATIENTS.



CBC W/PLT COUNT &amp; AUTO [id number] 09:30:00





 Test Item  Value  Reference Range  Comments

 

 WHITE BLOOD CELL COUNT (BEAKER) (test code=775)  6.5 K/ L  3.5-10.5  

 

 RED BLOOD CELL COUNT (BEAKER) (test code=761)  4.34 M/ L  4.63-6.08  

 

 HEMOGLOBIN (BEAKER) (test code=410)  14.1 GM/DL  13.7-17.5  

 

 HEMATOCRIT (BEAKER) (test code=411)  41.6 %  40.1-51.0  

 

 MEAN CORPUSCULAR VOLUME (BEAKER) (test code=753)  95.9 fL  79.0-92.2  

 

 MEAN CORPUSCULAR HEMOGLOBIN (BEAKER) (test  32.5 pg  25.7-32.2  



 skap=450)      

 

 MEAN CORPUSCULAR HEMOGLOBIN CONC (BEAKER) (test  33.9 GM/DL  32.3-36.5  



 code=752)      

 

 RED CELL DISTRIBUTION WIDTH (BEAKER) (test  12.3 %  11.6-14.4  



 code=412)      

 

 PLATELET COUNT (BEAKER) (test code=756)  174 K/CU MM  150-450  

 

 MEAN PLATELET VOLUME (BEAKER) (test code=754)  10.1 fL  9.4-12.4  

 

 NUCLEATED RED BLOOD CELLS (BEAKER) (test  0 /100 WBC  0-0  



 code=413)      

 

 NEUTROPHILS RELATIVE PERCENT (BEAKER) (test  60 %    



 code=429)      

 

 LYMPHOCYTES RELATIVE PERCENT (BEAKER) (test  27 %    



 code=430)      

 

 MONOCYTES RELATIVE PERCENT (BEAKER) (test  9 %    



 code=431)      

 

 EOSINOPHILS RELATIVE PERCENT (BEAKER) (test  4 %    



 code=432)      

 

 BASOPHILS RELATIVE PERCENT (BEAKER) (test  1 %    



 code=437)      

 

 NEUTROPHILS ABSOLUTE COUNT (BEAKER) (test  3.90 K/ L  1.78-5.38  



 code=670)      

 

 LYMPHOCYTES ABSOLUTE COUNT (BEAKER) (test  1.74 K/ L  1.32-3.57  



 code=414)      

 

 MONOCYTES ABSOLUTE COUNT (BEAKER) (test  0.56 K/ L  0.30-0.82  



 code=415)      

 

 EOSINOPHILS ABSOLUTE COUNT (BEAKER) (test  0.25 K/ L  0.04-0.54  



 code=416)      

 

 BASOPHILS ABSOLUTE COUNT (BEAKER) (test  0.04 K/ L  0.01-0.08  



 code=417)      

 

 IMMATURE GRANULOCYTES-RELATIVE PERCENT (BEAKER)  0 %  0-1  



 (test code=2801)

## 2021-01-16 ENCOUNTER — HOSPITAL ENCOUNTER (EMERGENCY)
Dept: HOSPITAL 97 - ER | Age: 71
Discharge: ANOTHER HOSPITAL | End: 2021-01-16
Payer: COMMERCIAL

## 2021-01-16 VITALS — DIASTOLIC BLOOD PRESSURE: 73 MMHG | SYSTOLIC BLOOD PRESSURE: 131 MMHG

## 2021-01-16 VITALS — OXYGEN SATURATION: 95 %

## 2021-01-16 VITALS — TEMPERATURE: 98.3 F

## 2021-01-16 DIAGNOSIS — N28.89: ICD-10-CM

## 2021-01-16 DIAGNOSIS — N17.9: ICD-10-CM

## 2021-01-16 DIAGNOSIS — N39.0: ICD-10-CM

## 2021-01-16 DIAGNOSIS — I48.92: ICD-10-CM

## 2021-01-16 DIAGNOSIS — N15.1: Primary | ICD-10-CM

## 2021-01-16 DIAGNOSIS — Z79.82: ICD-10-CM

## 2021-01-16 DIAGNOSIS — Z20.822: ICD-10-CM

## 2021-01-16 LAB
ALBUMIN SERPL BCP-MCNC: 3.9 G/DL (ref 3.4–5)
ALP SERPL-CCNC: 56 U/L (ref 45–117)
ALT SERPL W P-5'-P-CCNC: 97 U/L (ref 12–78)
AST SERPL W P-5'-P-CCNC: 1221 U/L (ref 15–37)
BUN BLD-MCNC: 40 MG/DL (ref 7–18)
GLUCOSE SERPLBLD-MCNC: 163 MG/DL (ref 74–106)
HCT VFR BLD CALC: 35.3 % (ref 39.6–49)
LIPASE SERPL-CCNC: 82 U/L (ref 73–393)
LYMPHOCYTES # SPEC AUTO: 1.3 K/UL (ref 0.7–4.9)
PMV BLD: 8.6 FL (ref 7.6–11.3)
POTASSIUM SERPL-SCNC: 5 MMOL/L (ref 3.5–5.1)
RBC # BLD: 3.68 M/UL (ref 4.33–5.43)

## 2021-01-16 PROCEDURE — 96375 TX/PRO/DX INJ NEW DRUG ADDON: CPT

## 2021-01-16 PROCEDURE — 82565 ASSAY OF CREATININE: CPT

## 2021-01-16 PROCEDURE — 36415 COLL VENOUS BLD VENIPUNCTURE: CPT

## 2021-01-16 PROCEDURE — 85025 COMPLETE CBC W/AUTO DIFF WBC: CPT

## 2021-01-16 PROCEDURE — 81003 URINALYSIS AUTO W/O SCOPE: CPT

## 2021-01-16 PROCEDURE — 96366 THER/PROPH/DIAG IV INF ADDON: CPT

## 2021-01-16 PROCEDURE — 87086 URINE CULTURE/COLONY COUNT: CPT

## 2021-01-16 PROCEDURE — 80076 HEPATIC FUNCTION PANEL: CPT

## 2021-01-16 PROCEDURE — 74176 CT ABD & PELVIS W/O CONTRAST: CPT

## 2021-01-16 PROCEDURE — 87088 URINE BACTERIA CULTURE: CPT

## 2021-01-16 PROCEDURE — 96361 HYDRATE IV INFUSION ADD-ON: CPT

## 2021-01-16 PROCEDURE — 83690 ASSAY OF LIPASE: CPT

## 2021-01-16 PROCEDURE — 96365 THER/PROPH/DIAG IV INF INIT: CPT

## 2021-01-16 PROCEDURE — 87040 BLOOD CULTURE FOR BACTERIA: CPT

## 2021-01-16 PROCEDURE — 99285 EMERGENCY DEPT VISIT HI MDM: CPT

## 2021-01-16 PROCEDURE — 81015 MICROSCOPIC EXAM OF URINE: CPT

## 2021-01-16 PROCEDURE — 80048 BASIC METABOLIC PNL TOTAL CA: CPT

## 2021-01-16 NOTE — XMS REPORT
Continuity of Care Document

                           Created on:2021



Patient:AUREA MALDONADO

Sex:Male

:1950

External Reference #:[id number]





Demographics







                          Address                   [address]



                                                    [place] [address]

 

                          Home Phone                [phone]

 

                          Mobile Phone              [phone]

 

                          Email Address             NONE

 

                          Preferred Language        English

 

                          Marital Status            Unknown

 

                          Evangelical Affiliation     Unknown

 

                          Race                      Unknown

 

                          Additional Race(s)        Unavailable



                                                    White

 

                          Ethnic Group              Unknown









Author







                          Organization              [facility]

t

 

                          Address                   [address] [place] [address] [address]



                                                    [place] [address]

 

                          Phone                     [phone]









Support







                Name            Relationship    Address         Phone

 

                Fazal         Extended family member [address] [place] [address] RD  [phone]



                                                [place] [address] 









Care Team Providers







                    Name                Role                Phone

 

                    Sugar Loyola       Primary Care Physician [phone]

 

                    Charlotte GR            Attending Clinician [phone]

 

                    JULIETH MEADOWS      Attending Clinician Unavailable

 

                    JULIETH MEADOWS      Admitting Clinician Unavailable









Problems







       Condition Condition Condition Status Onset  Resolution Last   Treating Co

mments 

Source



       Name   Details Category        Date   Date   Treatment Clinician        



                                                 Date                 

 

       BPH    BPH    Disease Active 2019                             [facility]



       (benign (benign               2-18                               [facility]



       prostatic prostatic               00:00:                             Medi

[facility]



       hyperplasi hyperplasi               00                                 Ce

nter



       a)     a)                                                      

 

       History of History of Disease Active                              [facility]

[facility]



       loop   loop                 4-03                               [facility]



       recorder recorder               00:00:                             [facility]

l



                                   00                                 Center

 

       Paroxysmal Paroxysmal Disease Active                              C

[facility]



       atrial atrial               1-30                               [facility]



       flutter flutter               00:00:                             Medical



                                   00                                 Center







Allergies, Adverse Reactions, Alerts

This patient has no known allergies or adverse reactions.



Social History







           Social Habit Start Date Stop Date  Quantity   Comments   Source

 

           Sex Assigned At                                             [facility]



           [facility]

 

           Tobacco use and 2019 Never used            [facility]

kes -



           exposure   00:00:00   00:00:00                         [facility]

 

           Alcohol intake 2019 Current               [facility]

es -



                      00:00:00   00:00:00   non-drinker of            Medical Ce

nter



                                            alcohol (finding)            

 

           Tobacco Comment 2019 quit x 50 yrs            [facility]



                      00:00:00   00:00:00                         [facility]









                Smoking Status  Start Date      Stop Date       Source

 

                Former smoker   2019 00:00:00 2019 00:00:00 [facility]

[facility] 

Center







Medications







       Ordered Filled Start  Stop   Current Ordering Indication Dosage Frequency

 Signature

                    Comments            Components          Source



     Medication Medication Date Date Medication? Clinician                (SIG) 

          



     Name Name                                                   

 

     metoprolol      2019      Yes            25mg QD   Take 25 mg           C

[facility]



     (LOPRESSOR)      2-18                               by mouth           [facility]

s -



     25 MG      16:22:                               daily .           Medical



     tablet      04                                                Center

 

     aspirin 81      2019      Yes            81mg QD   Take 81 mg           C

[facility]



     MG EC      2-18                               by mouth           [facility]



     tablet      16:22:                               daily.           Medical



               04                                                Center

 

     multivitami      2019      Yes            1{capsu QD   Take 1           C

[facility]



     n capsule      2-18                     le}       capsule by           [facility]

s -



               16:22:                               mouth           Medical



               04                                 daily.           Center

 

     b complex      2019      Yes            1{capsu QD   Take 1           [facility]



     vitamins      2-18                     le}       capsule by           [facility]

 -



     capsule      16:22:                               mouth           Medical



               04                                 daily.           [place]

 

     levoFLOXaci      2019      Yes            500mg QD   Take 500           [facility]

[facility]



     n         2-18                               mg by           [facility]



     (LEVAQUIN)      16:22:                               mouth           [facility]

l



     500 MG      04                                 daily.           Center



     tablet                                                        

 

     ascorbic      2019      Yes            500mg QD   Take 500           [facility]



     acid,      2-18                               mg by           [facility]



     vitamin C,      16:22:                               mouth           [facility]

l



     (ASCORBIC      04                                 daily.           [place]



     ACID WITH                                                        



     MACIEL HIPS)                                                        



     500 MG                                                        



     tablet                                                        







Procedures

This patient has no known procedures.



Plan of Care







             Planned Activity Planned Date Details      Comments     Source

 

             Future Scheduled 2020   INFLUENZA VACCINE (#1)              C

[facility]



             Test         00:00:00     [code = INFLUENZA              Medical Ce

nter



                                       VACCINE (#1)]              

 

             Future Scheduled 2015   MEDICARE ANNUAL              [facility]

[facility]



             Test         00:00:00     WELLNESS (YEAR 2 or              Medical 

Center



                                       FIRST YEAR if no IPPE)              



                                       [code = MEDICARE              



                                       ANNUAL WELLNESS (YEAR              



                                       2 or FIRST YEAR if no              



                                       IPPE)]                    

 

             Future Scheduled 2014   PNEUMOCOCCAL 65+ YRS              [facility]



             Test         00:00:00     (2 of 2 - PPSV23)              Medical Ce

nter



                                       [code = PNEUMOCOCCAL              



                                       65+ YRS (2 of 2 -              



                                       PPSV23)]                  

 

             Future Scheduled 1950   Screening for              [facility]

es -



             Test         00:00:00     malignant neoplasm of              Medica

l Center



                                       colon (procedure)              



                                       [code = 183229850]              







Encounters







        Start   End     Encounter Admission Attending Care    Care    Encounter 

Source



        Date/Time Date/Time Type    Type    Clinicians Facility Department ID   

   

 

        2019 Office          ABIGAIL Porter     [id number]

[id number] 



        11:30:47 11:40:47 Visit           Leandro   AMBULATOR [id number]         



                                                Y       [id number]         



                                                        [phone]         



                                                        300             







Results







           Test Description Test Time  Test Comments Results    Result Comments 

Source









                    BASIC METABOLIC PANEL 2018 10:09:00 









                      Test Item  Value      Reference Range Interpretation Comme

nts









             SODIUM (BEAKER) (test code 141 meq/L    136-145                   



             = 381)                                              

 

             POTASSIUM (BEAKER) (test 4.5 meq/L    3.5-5.1                   



             code = 379)                                         

 

             CHLORIDE (BEAKER) (test 108 meq/L           H            



             code = 382)                                         

 

             CO2 (BEAKER) (test code = 23 meq/L     22-29                     



             355)                                                

 

             BLOOD UREA NITROGEN 18 mg/dL     7-21                      



             (BEAKER) (test code = 354)                                        

 

             CREATININE (BEAKER) (test 0.85 mg/dL   0.57-1.25                 



             code = 358)                                         

 

             GLUCOSE RANDOM (BEAKER) 107 mg/dL           H            



             (test code = 652)                                        

 

             CALCIUM (BEAKER) (test code 9.4 mg/dL    8.4-10.2                  



             = 697)                                              

 

             EGFR (BEAKER) (test code = 89 mL/min/1.73 sq m                     

      ESTIMATED GFR IS NOT AS



             1092)                                               ACCURATE AS CRE

ATININE



                                                                 CLEARANCE IN [place]

EDICTING



                                                                 GLOMERULAR FILT

RATION



                                                                 RATE. ESTIMATED

 GFR IS NOT



                                                                 APPLICABLE FOR 

DIALYSIS



                                                                 PATIENTS.



PROTHROMBIN [id number] 10:02:00





             Test Item    Value        Reference Range Interpretation Comments

 

             PROTIME (BEAKER) (test code = 14.4 seconds 11.7-14.7               

  



             759)                                                

 

             INR (BEAKER) (test code = 370) 1.1          <=5.9                  

   



RECOMMENDED COUMADIN/WARFARIN INR THERAPY RANGESSTANDARD DOSE: 2.0 - 3.0   
Includes: PROPHYLAXIS forvenous thrombosis, systemic embolization; TREATMENT for
venous thrombosis and/or pulmonary embolus.HIGH RISK: Target INR is 2.5-3.5 for 
patients with mechanical heart valves.Within 24 hours, if on CoumadinCBC W/PLT 
COUNT &amp; AUTO [id number] 09:53:00





             Test Item    Value        Reference Range Interpretation Comments

 

             WHITE BLOOD CELL COUNT (BEAKER) 5.9 K/ L     3.5-10.5              

    



             (test code = 775)                                        

 

             RED BLOOD CELL COUNT (BEAKER) 4.29 M/ L    4.63-6.08    L          

  



             (test code = 761)                                        

 

             HEMOGLOBIN (BEAKER) (test code = 14.3 GM/DL   13.7-17.5            

     



             410)                                                

 

             HEMATOCRIT (BEAKER) (test code = 41.9 %       40.1-51.0            

     



             411)                                                

 

             MEAN CORPUSCULAR VOLUME (BEAKER) 97.7 fL      79.0-92.2    H       

     



             (test code = 753)                                        

 

             MEAN CORPUSCULAR HEMOGLOBIN 33.3 pg      25.7-32.2    H            



             (BEAKER) (test code = 751)                                        

 

             MEAN CORPUSCULAR HEMOGLOBIN CONC 34.1 GM/DL   32.3-36.5            

     



             (BEAKER) (test code = 752)                                        

 

             RED CELL DISTRIBUTION WIDTH 12.6 %       11.6-14.4                 



             (BEAKER) (test code = 412)                                        

 

             PLATELET COUNT (BEAKER) (test 177 K/CU MM  150-450                 

  



             code = 756)                                         

 

             MEAN PLATELET VOLUME (BEAKER) 10.4 fL      9.4-12.4                

  



             (test code = 754)                                        

 

             NUCLEATED RED BLOOD CELLS 0 /100 WBC   0-0                       



             (BEAKER) (test code = 413)                                        

 

             NEUTROPHILS RELATIVE PERCENT 55 %                                  

 



             (BEAKER) (test code = 429)                                        

 

             LYMPHOCYTES RELATIVE PERCENT 30 %                                  

 



             (BEAKER) (test code = 430)                                        

 

             MONOCYTES RELATIVE PERCENT 10 %                                   



             (BEAKER) (test code = 431)                                        

 

             EOSINOPHILS RELATIVE PERCENT 5 %                                   

 



             (BEAKER) (test code = 432)                                        

 

             BASOPHILS RELATIVE PERCENT 1 %                                    



             (BEAKER) (test code = 437)                                        

 

             NEUTROPHILS ABSOLUTE COUNT 3.25 K/ L    1.78-5.38                 



             (BEAKER) (test code = 670)                                        

 

             LYMPHOCYTES ABSOLUTE COUNT 1.76 K/ L    1.32-3.57                 



             (BEAKER) (test code = 414)                                        

 

             MONOCYTES ABSOLUTE COUNT (BEAKER) 0.59 K/ L    0.30-0.82           

      



             (test code = 415)                                        

 

             EOSINOPHILS ABSOLUTE COUNT 0.27 K/ L    0.04-0.54                 



             (BEAKER) (test code = 416)                                        

 

             BASOPHILS ABSOLUTE COUNT (BEAKER) 0.05 K/ L    0.01-0.08           

      



             (test code = 417)                                        

 

             IMMATURE GRANULOCYTES-RELATIVE 0 %          0-1                    

   



             PERCENT (BEAKER) (test code =                                      

  



             2801)                                               



BASIC METABOLIC [id number] 09:44:00





             Test Item    Value        Reference Range Interpretation Comments

 

             SODIUM (BEAKER) 137 meq/L    136-145                   



             (test code = 381)                                        

 

             POTASSIUM (BEAKER) 4.3 meq/L    3.5-5.1                   



             (test code = 379)                                        

 

             CHLORIDE (BEAKER) 104 meq/L                        



             (test code = 382)                                        

 

             CO2 (BEAKER) (test 26 meq/L     22-29                     



             code = 355)                                         

 

             BLOOD UREA NITROGEN 17 mg/dL     7-21                      



             (BEAKER) (test code                                        



             = 354)                                              

 

             CREATININE (BEAKER) 1.02 mg/dL   0.57-1.25                 



             (test code = 358)                                        

 

             GLUCOSE RANDOM 98 mg/dL                         



             (BEAKER) (test code                                        



             = 652)                                              

 

             CALCIUM (BEAKER) 9.1 mg/dL    8.4-10.2                  



             (test code = 697)                                        

 

             EGFR (BEAKER) (test 73 mL/min/1.73                           ESTIMA

CHIDI GFR IS



             code = 1092) sq m                                   NOT AS ACCURATE

 AS



                                                                 CREATININE



                                                                 CLEARANCE IN



                                                                 PREDICTING



                                                                 GLOMERULAR



                                                                 FILTRATION RATE

.



                                                                 ESTIMATED GFR I

S



                                                                 NOT APPLICABLE 

FOR



                                                                 DIALYSIS PATIEN

TS.



CBC W/PLT COUNT &amp; AUTO [id number] 09:30:00





             Test Item    Value        Reference Range Interpretation Comments

 

             WHITE BLOOD CELL COUNT (BEAKER) 6.5 K/ L     3.5-10.5              

    



             (test code = 775)                                        

 

             RED BLOOD CELL COUNT (BEAKER) 4.34 M/ L    4.63-6.08    L          

  



             (test code = 761)                                        

 

             HEMOGLOBIN (BEAKER) (test code = 14.1 GM/DL   13.7-17.5            

     



             410)                                                

 

             HEMATOCRIT (BEAKER) (test code = 41.6 %       40.1-51.0            

     



             411)                                                

 

             MEAN CORPUSCULAR VOLUME (BEAKER) 95.9 fL      79.0-92.2    H       

     



             (test code = 753)                                        

 

             MEAN CORPUSCULAR HEMOGLOBIN 32.5 pg      25.7-32.2    H            



             (BEAKER) (test code = 751)                                        

 

             MEAN CORPUSCULAR HEMOGLOBIN CONC 33.9 GM/DL   32.3-36.5            

     



             (BEAKER) (test code = 752)                                        

 

             RED CELL DISTRIBUTION WIDTH 12.3 %       11.6-14.4                 



             (BEAKER) (test code = 412)                                        

 

             PLATELET COUNT (BEAKER) (test 174 K/CU MM  150-450                 

  



             code = 756)                                         

 

             MEAN PLATELET VOLUME (BEAKER) 10.1 fL      9.4-12.4                

  



             (test code = 754)                                        

 

             NUCLEATED RED BLOOD CELLS 0 /100 WBC   0-0                       



             (BEAKER) (test code = 413)                                        

 

             NEUTROPHILS RELATIVE PERCENT 60 %                                  

 



             (BEAKER) (test code = 429)                                        

 

             LYMPHOCYTES RELATIVE PERCENT 27 %                                  

 



             (BEAKER) (test code = 430)                                        

 

             MONOCYTES RELATIVE PERCENT 9 %                                    



             (BEAKER) (test code = 431)                                        

 

             EOSINOPHILS RELATIVE PERCENT 4 %                                   

 



             (BEAKER) (test code = 432)                                        

 

             BASOPHILS RELATIVE PERCENT 1 %                                    



             (BEAKER) (test code = 437)                                        

 

             NEUTROPHILS ABSOLUTE COUNT 3.90 K/ L    1.78-5.38                 



             (BEAKER) (test code = 670)                                        

 

             LYMPHOCYTES ABSOLUTE COUNT 1.74 K/ L    1.32-3.57                 



             (BEAKER) (test code = 414)                                        

 

             MONOCYTES ABSOLUTE COUNT (BEAKER) 0.56 K/ L    0.30-0.82           

      



             (test code = 415)                                        

 

             EOSINOPHILS ABSOLUTE COUNT 0.25 K/ L    0.04-0.54                 



             (BEAKER) (test code = 416)                                        

 

             BASOPHILS ABSOLUTE COUNT (BEAKER) 0.04 K/ L    0.01-0.08           

      



             (test code = 417)                                        

 

             IMMATURE GRANULOCYTES-RELATIVE 0 %          0-1                    

   



             PERCENT (BEAKER) (test code =                                      

  



             7353)

## 2021-01-16 NOTE — ER
Nurse's Notes                                                                                     

 [facility]                                                                 

Name: Ganesh Diehl                                                                              

Age: 70 yrs                                                                                       

Sex: Male                                                                                         

: 1950                                                                                   

MRN: [id number]                                                                                   

Arrival Date: 2021                                                                          

Time: 11:06                                                                                       

Account#: [id number]                                                                            

Bed 19                                                                                            

Private MD:                                                                                       

Diagnosis: Right perinephric hematoma;Right renal mass;Acute kidney injury;Urinary tract          

  infection, site not specified                                                                   

                                                                                                  

Presentation:                                                                                     

                                                                                             

11:17 Chief complaint: Patient states: Low back pain that wraps around to abdomen for 2 days. ll1 

      No fever. Dizzy, nausea, fatigue, weakness, malaise, constipation, cold/hot sweats.         

      Coronavirus screen: Client denies travel out of the [place] in the last 14 days. nausea,       

      shaking with chills. Ebola Screen: Patient denies travel to an Ebola-affected area in       

      the 21 days before illness onset. Initial Sepsis Screen: Does the patient meet any 2        

      criteria? HR > 90 bpm. No. Patient's initial sepsis screen is negative. Does the            

      patient have a suspected source of infection? Yes: Acute abdominal pain. Risk               

      Assessment: Do you want to hurt yourself or someone else? Patient reports no desire to      

      harm self or others. Onset of symptoms was January 15, 2021.                                

11:17 Method Of Arrival: Wheelchair                                                           ll1 

11:17 Acuity: CHANO 3                                                                           ll1 

                                                                                                  

Triage Assessment:                                                                                

11:29 General: Appears uncomfortable, Behavior is calm, cooperative, appropriate for age.     ll1 

      Pain: Complains of pain in abdomen/low back Pain currently is 6 out of 10 on a pain         

      scale. Quality of pain is described as aching, Pain began 1 day ago. Neuro: Level of        

      Consciousness is awake, alert, obeys commands, Oriented to person, place, time,             

      situation, Appropriate for age  are equal bilaterally Moves all extremities. Full      

      function Weakness Gait is steady, Speech is normal, Facial symmetry appears normal,         

      Reports dizziness. Cardiovascular: No deficits noted. Respiratory: No deficits noted.       

      GI: Abdomen is round Reports lower abdominal pain, upper abdominal pain, constipation,      

      gaseousness, indigestion, nausea. : Denies burning with urination.                        

                                                                                                  

Historical:                                                                                       

- Allergies:                                                                                      

11:17 No Known Allergies;                                                                     ll1 

- Home Meds:                                                                                      

12:24 Metoprolol Tartrate Oral [Active]; aspirin 81 mg Oral chew [Active];                    vg1 

- PMHx:                                                                                           

11:17 atrial flutter;                                                                         ll1 

- PSHx:                                                                                           

11:17 urolift;                                                                                ll1 

                                                                                                  

- Immunization history:: Flu vaccine is up to date.                                               

- Social history:: Smoking status: Patient denies any tobacco usage or history of.                

                                                                                                  

                                                                                                  

Screenin:19 Abuse screen: Denies threats or abuse. Nutritional screening: No deficits noted.        ll1 

      Tuberculosis screening: No symptoms or risk factors identified.                             

16:23 Fall Risk No fall in past 12 months (0 pts). No secondary diagnosis (0 pts). IV access  vg1 

      (20 points). Ambulatory Aid- None/Bed Rest/Nurse Assist (0 pts). Gait- Normal/Bed           

      Rest/Wheelchair (0 pts) Mental Status- Oriented to own ability (0 pts). Total Obrien         

      Fall Scale indicates No Risk (0-24 pts).                                                    

                                                                                                  

Assessment:                                                                                       

12:15 General: Appears in no apparent distress. uncomfortable, Behavior is calm, cooperative. vg1 

      Pain: Complains of pain in left low back and right low back and lower abdomen. Pain         

      currently is 8 out of 10 on a pain scale. Quality of pain is described as sharp,            

      Aggravated by repositioning. Neuro: Level of Consciousness is awake, alert, obeys           

      commands, Oriented to person, place, time, situation. Cardiovascular: Patient's skin is     

      warm and dry. Respiratory: Airway is patent Respiratory effort is even, unlabored,          

      Respiratory pattern is regular, symmetrical. GI: Abdomen is round Last BM was 2021. Bowel sounds present X 4 quads. Abd is soft X 4 quads Abdomen is tender to        

      palpation in right lower quadrant and left lower quadrant Reports nausea. : No signs      

      and/or symptoms were reported regarding the genitourinary system. EENT: No signs and/or     

      symptoms were reported regarding the EENT system. Derm: Skin is intact, is healthy with     

      good turgor. Musculoskeletal: Circulation, motion, and sensation intact.                    

12:45 Reassessment: Lab called and Cathy stated critical lab results, AST 1221. Provider   vg1 

      Notified.                                                                                   

14:00 Reassessment: Patient appears in no apparent distress at this time. No changes from     vg1 

      previously documented assessment. Patient and/or family updated on plan of care and         

      expected duration. Pain level reassessed. Patient is alert, oriented x 3, equal             

      unlabored respirations, skin warm/dry/pink.                                                 

15:30 Reassessment: Patient appears in no apparent distress at this time. No changes from     vg1 

      previously documented assessment.                                                           

16:29 Reassessment: Attempted to call report. Stated will call back in 15 minutes.            vg1 

17:21 Reassessment: Attempted to call report. Stated will call back.                          vg1 

17:34 Reassessment: Report given to Candy Lambert RN.                                          vg1 

                                                                                                  

Vital Signs:                                                                                      

11:17  / 88; Pulse 100; Resp 17; Temp 98.3; Pulse Ox 96% ; Weight 127.01 kg; Height 6   ll1 

      ft. 1 in. (185.42 cm); Pain 6/10;                                                           

12:15  / 100; Pulse 130; Resp 18; Pulse Ox 100% on R/A;                                 vg1 

12:45  / 88; Pulse 88; Resp 16; Pulse Ox 98% on R/A;                                    vg1 

13:00  / 73; Pulse 86; Resp 16; Pulse Ox 98% on R/A;                                    vg1 

14:00  / 80; Pulse 90; Resp 16; Pulse Ox 99% on R/A;                                    vg1 

15:00  / 70; Pulse 88; Resp 16; Pulse Ox 97% on R/A;                                    vg1 

16:00  / 53; Pulse 89; Resp 18; Pulse Ox 95% on R/A;                                    vg1 

18:00  / 73; Pulse 87; Resp 20; Pulse Ox 95% on R/A;                                    vg1 

11:17 Body Mass Index 36.94 (127.01 kg, 185.42 cm)                                            1 

                                                                                                  

ED Course:                                                                                        

11:06 Patient arrived in ED.                                                                  ds1 

11:15 Arm band placed on.                                                                     ll1 

11:19 Triage completed.                                                                       ll1 

11:27 Jordin Murdock NP is PHCP.                                                           pm1 

11:27 Kobi Olivia MD is Attending Physician.                                              pm1 

11:29 Queenie Aguirre, RN is Primary Nurse.                                                     ll1 

11:31 Patient has correct armband on for positive identification. Bed in low position. Call   [facility] 

      light in reach. Side rails up X 1. Pulse ox on. NIBP on.                                    

11:31 No provider procedures requiring assistance completed.                                  ll1 

12:00 Primary Nurse role handed off by Queenie Aguirre, RN                                      vg1 

12:00 Almaz Toledo, RN is Primary Nurse.                                                  vg1 

12:10 Verbal reassurance given.                                                               jp3 

12:10 Initial lab(s) drawn, by me, sent to lab. Inserted saline lock: 20 gauge in right       jp3 

      antecubital area, using aseptic technique. Blood collected. Patient maintains SpO2          

      saturation greater than 95% on room air.                                                    

12:32 Patient moved to CT via stretcher.                                                      vg1 

12:42 Abdomen In Process Unspecified.                                                         EDMS

12:46 Patient moved back from CT.                                                             vg1 

12:58 Basic Metabolic Panel Sent.                                                             sv  

13:45 First set of blood cultures drawn by me.                                                jp3 

13:55 Second set of blood cultures drawn by me.                                               jp3 

14:10 initiated a transfer Tangela from the [facility] [place].                    eb  

15:02 connected Dr. Khan the urologist on call for [facility] with Jordin Alatorre for         

      patient transfer consultation.                                                              

15:48 connected Dr. Sanchez the hospitalist on call for [facility] with Jordin Np for   

      patient transfer consultation.                                                              

16:09 administrative approval given by Tangela Moreno/ patient has been accepted to [facility] bed 1611/ Dr. Sanchez has accepted the patient in transfer/ report to be      

      called through the transfer center at [phone].                                         

17:37 Patient transferred, IV remains in place.                                               vg1 

                                                                                                  

Administered Medications:                                                                         

12:21 Drug: Zofran (Ondansetron) 4 mg Route: IVP; Site: right antecubital;                    ll1 

13:39 Follow up: Response: No adverse reaction                                                vg1 

12:22 Drug: NS 0.9% 1000 ml Route: IV; Rate: 1000 ml; Site: right antecubital;                ll1 

13:30 Follow up: IV Status: Completed infusion                                                vg1 

12:22 Drug: morphine 4 mg {Note: rass 0.} Route: IVP; Site: right antecubital;                ll1 

13:39 Follow up: Response: Pain is decreased                                                  vg1 

13:39 Follow up: Response: RASS: Alert and Calm (0)                                           vg1 

14:12 Drug: Rocephin 1 grams Route: IV; Rate: calculated rate; Site: right antecubital;       vg1 

16:09 Follow up: Response: No adverse reaction; IV Status: Completed infusion                 vg1 

14:12 Drug: NS 0.9% 1000 ml Route: IV; Rate: 125 ml/hr; Site: right antecubital;              vg1 

19:00 Follow up: IV Status: Completed infusion                                                vg1 

16:09 Drug: NS 0.9% 500 ml Route: IV; Rate: bolus; Site: right antecubital;                   vg1 

17:20 Follow up: IV Status: Completed infusion; IV Intake: 500ml                              vg1 

16:57 Drug: morphine 4 mg Route: IVP; Site: right antecubital;                                em  

18:59 Follow up: Response: Pain is decreased                                                  vg1 

18:59 Drug: morphine 4 mg {Note: rass 0.} Route: IVP; Site: right antecubital;                vg1 

19:00 Follow up: Response: Medication administered at discharge.                              vg1 

18:59 Drug: Zofran (Ondansetron) 4 mg Route: IVP; Site: right antecubital;                    vg1 

19:00 Follow up: Response: Medication administered at discharge.                              vg1 

                                                                                                  

                                                                                                  

Intake:                                                                                           

17:20 IV: 500ml; Total: 500ml.                                                                vg1 

                                                                                                  

Outcome:                                                                                          

13:48 ER care complete, transfer ordered by MD.                                               pm1 

17:37 Transferred by ground EMS Transfer form completed.                                      vg1 

17:37 Condition: stable                                                                           

17:37 Instructed on the need for transfer.                                                        

19:02 Patient left the ED.                                                                    vg1 

                                                                                                  

Signatures:                                                                                       

Dispatcher [employer]                           Mary Ann Crockett, RN                    Alverto Schreiber RN                        [place]                                                      

Doris Ramirez                                ds1                                                  

Jordin Murdock, NP                    NP   pm1                                                  

Park Soliman Jacob jp3 Garcia, Victoria, RN RN   1                                                  

Queenie Aguirre RN                       RN   1                                                  

                                                                                                  

Corrections: (The following items were deleted from the chart)                                    

17:21 16:29 Reassessment: Attempted to call report. vg1                                       vg1 

                                                                                                  

**************************************************************************************************

## 2021-01-16 NOTE — RAD REPORT
EXAM DESCRIPTION:  CT - Abdomen   Pelvis Wo Contrast - 1/16/2021 12:41 pm

 

CLINICAL HISTORY:  Abdominal pain.

FLANK PAIN

 

COMPARISON:  No comparisons

 

TECHNIQUE:  CT imaging of the abdomen and pelvis was performed without contrast. Solid organ, bowel a
nd vascular assessment is limited due to lack of IV and oral contrast.

 

All CT scans are performed using dose optimization technique as appropriate and may include automated
 exposure control or mA/KV adjustment according to patient size.

 

FINDINGS:  Linear subsegmental atelectasis is present in both lung bases.

 

The liver, spleen, pancreas, adrenal glands and left kidney are within normal limits for a limited no
n-contrast examination.

 

There is a large right perinephric hematoma present with mass effect on the parenchyma of the right k
idney. The hematoma measures approximately 13 x 8 cm. There is a mass lesion noted along the inferola
teral right kidney measuring 7.5 x 5.4 cm. Blood product is seen extending along the anterior pararen
al fascia inferiorly along the right pericolic gutter.

 

No bowel obstruction, free air, free fluid or abscess. Small bilateral fat containing inguinal hernia
s. Prostate radiation beads are seen.

 

Degenerative changes are present involving the thoracolumbar spine..

 

IMPRESSION:  Large right perinephric hematoma (13 x 8 cm) likely related to bleeding from solid 7.5 c
m right renal mass, likely renal cell carcinoma. Evaluation is limited by lack of IV contrast.

 

A limited non-contrast examination was performed as detailed.

## 2021-01-16 NOTE — XMS REPORT
Clinical Summary

                           Created on:2021



Patient:Ganesh Diehl

Sex:Male

:1950

External Reference #:[id number]





Demographics







                          Address                   [address] [place] [address] 



                                                    [place] [address]

 

                          Mobile Phone              [phone]

 

                          Home Phone                [phone]

 

                          Preferred Language        English

 

                          Marital Status            Unknown

 

                          Muslim Affiliation     Unknown

 

                          Race                      White

 

                          Ethnic Group              Not  or 









Author







                          Organization              [facility]

 

                          Address                   [address] [place] [address]









Support







                Name            Relationship    Address         Phone

 

                Alessandro Diehl   Unavailable     [address] [place] [address] [address]  [phone]



                                                [place] [address] 









Care Team Providers







                    Name                Role                Phone

 

                    Sugar Loyola       Primary Care Provider [phone]









Allergies

No Known Allergies



Medications







          Medication Sig       Dispensed Refills   Start Date End Date  Status

 

          metoprolol (LOPRESSOR) Take 25 mg by           0                      

       Active



          25 MG tablet mouth daily .                                         

 

          aspirin 81 MG EC tablet Take 81 mg by           0                     

        Active



                    mouth daily.                                         

 

          multivitamin capsule Take 1 capsule by           0                    

         Active



                    mouth daily.                                         

 

          b complex vitamins Take 1 capsule by           0                      

       Active



          capsule   mouth daily.                                         

 

          levoFLOXacin (LEVAQUIN) Take 500 mg by           0                    

         Active



          500 MG    mouth daily.                                         



          tabletIndications: x 3                                                

   



          doses only                                                   

 

          ascorbic acid, vitamin Take 500 mg by           0                     

        Active



          C, (ASCORBIC ACID WITH mouth daily.                                   

      



          MACIEL HIPS) 500 MG                                                   



          tablet                                                      







Active Problems







                          Problem                   Noted Date

 

                          BPH (benign prostatic hyperplasia) 2019

 

                          History of loop recorder  2018

 

                          Paroxysmal atrial flutter 2018







Social History







             Tobacco Use  Types        Packs/Day    Years Used   Date

 

             Former Smoker                                        









                Smokeless Tobacco: Never Used                                 









                                        Comments: quit x 50 yrs









                Alcohol Use     Drinks/Week     oz/Week         Comments

 

                No                                              









                          Sex Assigned at Birth     Date Recorded

 

                          Not on file               







Last Filed Vital Signs

Not on file



Plan of Treatment







                Health Maintenance Due Date        Last Done       Comments

 

                COLON CANCER SCREENING COLONOSCOPY 1950                   

   

 

                PNEUMOCOCCAL 65+ YRS (2 of 2 - PPSV23) 2014

6      

 

                MEDICARE ANNUAL WELLNESS (YEAR 2 or FIRST 2015            

          



                YEAR if no IPPE)                                 

 

                INFLUENZA VACCINE (#1) 2020      10/10/2017, 10/27/2016 







Implants







          Implanted Type      Area       Device    Shelf     Model /



                                                  Identifier Expiration Serial /



                                                            Date      Lot

 

                Recorder Loop Reveal Xt Icm - [id number] Cardiovascular  Left: 

Chest     

[employer]:CARD                          2018          [id number] /



                                        Implanted: Qty: 1 on 2018 by Paul Shepherd MD at [facility]                       RHY:DISEASE                       RL

[id number] /



                                        MGT                           

 

           Sys Urological Bph Urolif [id number] - [id number] IMPLANTS   N/A:       N

[employer]            

2021                              -4 /



                                        Implanted: Qty: 5 on 2019 by Leandro Porter MD at [facility]            Prostate                                     /



                                                                      [id number]







Results

Not on fileafter 2020



Insurance







          Payer     Benefit Plan / Subscriber ID Effective Dates Phone     Addre

ss   Type



                    Group                                             

 

          MEDICARE  MEDICARE A B [id number] 3/1/2014-Present                   

  Medicare

 

          [employer]       GENERIC MEDICARE [id number] 2018-Present                

     [employer]



          SUPPLEMENT/ANA CRISTINA SUPPLEMENT                                         



          VIDUAL                                                      









           Guarantor Name Account Type Relation to Date of    Phone      Billing



                                 Patient    Birth                 Address

 

           Ganesh Diehl Personal/Family Self       1950 [phone]

[address] [place] [address]                                        (Home)     [address] [place] [address]



                                                                  [address]







Advance Directives

For more information, please contact: [phone]





                Code Status     Date Activated  Date Inactivated Comments

 

                Full Code       4/3/2018  9:14 AM 4/3/2018  5:54 PM 









                    This code status was determined by: Patient             









                                                                

 

                Full Code       2018  8:33 AM 2018  7:22 PM 









                    This code status was determined by: Patient             









                                                                

 

                Full Code       2017  2:33 PM 2017  2:35 PM 









                    This code status was determined by: Patient

## 2021-01-16 NOTE — EDPHYS
Physician Documentation                                                                           

 [facility]                                                                 

Name: Ganesh Diehl                                                                              

Age: 70 yrs                                                                                       

Sex: Male                                                                                         

: 1950                                                                                   

MRN: [id number]                                                                                   

Arrival Date: 2021                                                                          

Time: 11:06                                                                                       

Account#: [id number]                                                                            

Bed 19                                                                                            

Private MD:                                                                                       

ED Physician Kobi Olivia                                                                       

HPI:                                                                                              

                                                                                             

12:27 This 70 yrs old  Male presents to ER via Wheelchair with complaints of Kidney  pm1 

      Pain, Nausea.                                                                               

12:27 The patient complains of pain in the right low back. The pain radiates to the abdomen   pm1 

      and right lower quadrant. Onset: The symptoms/episode began/occurred 2 day(s) ago.          

      Modifying factors: The symptoms are alleviated by nothing. the symptoms are aggravated      

      by movement. Associated signs and symptoms: Pertinent positives: nausea, vomiting, No       

      appetite. Has not been eating drinking since onset of pain. Severity of pain: in the        

      emergency department the pain is actually worse. The patient has not experienced            

      similar symptoms in the past.                                                               

                                                                                                  

Historical:                                                                                       

- Allergies:                                                                                      

11:17 No Known Allergies;                                                                     ll1 

- Home Meds:                                                                                      

12:24 Metoprolol Tartrate Oral [Active]; aspirin 81 mg Oral chew [Active];                    vg1 

- PMHx:                                                                                           

11:17 atrial flutter;                                                                         ll1 

- PSHx:                                                                                           

11:17 urolift;                                                                                ll1 

                                                                                                  

- Immunization history:: Flu vaccine is up to date.                                               

- Social history:: Smoking status: Patient denies any tobacco usage or history of.                

                                                                                                  

                                                                                                  

ROS:                                                                                              

12:27 Cardiovascular: Negative for chest pain, palpitations, and edema, Respiratory: Negative pm1 

      for shortness of breath, cough, wheezing, and pleuritic chest pain.                         

12:27 : Negative for injury, bleeding, discharge, and swelling, MS/Extremity: Negative for      

      injury and deformity, Skin: Negative for injury, rash, and discoloration, Neuro:            

      Negative for headache, weakness, numbness, tingling, and seizure.                           

12:27 Constitutional: Positive for poor PO intake, Negative for body aches, chills, fever.        

12:27 Abdomen/GI: Positive for abdominal pain, nausea and vomiting, constipation, of the          

      right lower quadrant, Negative for diarrhea.                                                

12:27 Back: Positive for flank pain, on the right.                                                

                                                                                                  

Exam:                                                                                             

12:27 Head/Face:  Normocephalic, atraumatic.                                                  pm1 

12:27 Skin:  Warm, dry with normal turgor.  Normal color with no rashes, no lesions, and no       

      evidence of cellulitis. MS/ Extremity:  Pulses equal, no cyanosis.  Neurovascular           

      intact.  Full, normal range of motion.                                                      

12:27 Constitutional: The patient appears alert, awake, non-diaphoretic, non-toxic, well          

      developed, well hydrated, well groomed, well nourished, in obvious pain.                    

12:27 Cardiovascular: Exam negative for  acute changes, Rate: normal, Rhythm: regular,            

      Pulses: no pulse deficits are appreciated.                                                  

12:27 Respiratory: Exam negative for  acute changes, respiratory distress, shortness of           

      breath.                                                                                     

12:27 Abdomen/GI: Inspection: obese Palpation: soft, in all quadrants, moderate abdominal         

      tenderness, in the right upper quadrant and right lower quadrant.                           

12:27 Back: pain, that is moderate, of the  right low back.                                       

12:27 Neuro: Exam negative for acute changes, Orientation: is normal, Mentation: is normal,       

      Motor: is normal, moves all fours.                                                          

                                                                                                  

Vital Signs:                                                                                      

11:17  / 88; Pulse 100; Resp 17; Temp 98.3; Pulse Ox 96% ; Weight 127.01 kg; Height 6   ll1 

      ft. 1 in. (185.42 cm); Pain 6/10;                                                           

12:15  / 100; Pulse 130; Resp 18; Pulse Ox 100% on R/A;                                 vg1 

12:45  / 88; Pulse 88; Resp 16; Pulse Ox 98% on R/A;                                    vg1 

13:00  / 73; Pulse 86; Resp 16; Pulse Ox 98% on R/A;                                    vg1 

14:00  / 80; Pulse 90; Resp 16; Pulse Ox 99% on R/A;                                    vg1 

15:00  / 70; Pulse 88; Resp 16; Pulse Ox 97% on R/A;                                    vg1 

16:00  / 53; Pulse 89; Resp 18; Pulse Ox 95% on R/A;                                    vg1 

18:00  / 73; Pulse 87; Resp 20; Pulse Ox 95% on R/A;                                    vg1 

11:17 Body Mass Index 36.94 (127.01 kg, 185.42 cm)                                            ll1 

                                                                                                  

MDM:                                                                                              

11:59 Patient medically screened.                                                             pm1 

13:15 Data reviewed: vital signs.                                                             pm1 

13:29 Counseling: I had a detailed discussion with the patient and/or guardian regarding: the pm1 

      historical points, exam findings, and any diagnostic results supporting the                 

      discharge/admit diagnosis, lab results, radiology results, the need to transfer to          

      another facility, [facility] does not immediately have the required       

      specialist, No urology.                                                                     

15:06 Physician consultation: MD Urologbutch Khan was contacted at 15:07, regarding patient's   pm1 

      condition, and will see patient.                                                            

15:56 Physician consultation: MD Sanchez was contacted at 15:58, regarding regarding       pm1 

      transfer, patient's condition, and will see patient.                                        

                                                                                                  

                                                                                             

12:01 Order name: Basic Metabolic Panel                                                       pm1 

                                                                                             

12:01 Order name: CBC with Diff; Complete Time: 12:50                                         pm1 

                                                                                             

12:01 Order name: Hepatic Function; Complete Time: 12:50                                      pm                                                                                             

12:01 Order name: Lipase; Complete Time: 12:50                                                pm1 

                                                                                             

12:01 Order name: Basic Metabolic Panel; Complete Time: 12:50                                 EDMS

                                                                                             

12:51 Order name: Urine Microscopic Only; Complete Time: 16:58                                pm1 

                                                                                             

12:27 Order name: Abdomen ; Complete Time: 12:54                                              EDMS

                                                                                             

12:51 Order name: Blood Culture Adult (2)                                                     pm                                                                                             

13:11 Order name: CREATININE WHOLE BLOOD; Complete Time: 13:30                                EDMS

                                                                                             

16:08 Order name: Urine Culture                                                               pm                                                                                             

16:19 Order name: Urine Dipstick--Ancillary (enter results); Complete Time: 16:58               

                                                                                             

17:26 Order name: SARS-COV-2 RT PCR; Complete Time: 17:26                                     EDMS

                                                                                             

12:01 Order name: IV Saline Lock; Complete Time: 12:16                                        pm                                                                                             

12:01 Order name: Labs collected and sent; Complete Time: 12:16                               pm                                                                                             

12:51 Order name: Urine Dipstick-Ancillary (obtain specimen); Complete Time: 16:10            pm1 

                                                                                                  

Administered Medications:                                                                         

12:21 Drug: Zofran (Ondansetron) 4 mg Route: IVP; Site: right antecubital;                    ll1 

13:39 Follow up: Response: No adverse reaction                                                vg1 

12:22 Drug: NS 0.9% 1000 ml Route: IV; Rate: 1000 ml; Site: right antecubital;                ll1 

13:30 Follow up: IV Status: Completed infusion                                                vg1 

12:22 Drug: morphine 4 mg {Note: rass 0.} Route: IVP; Site: right antecubital;                ll1 

13:39 Follow up: Response: Pain is decreased                                                  vg1 

13:39 Follow up: Response: RASS: Alert and Calm (0)                                           vg1 

14:12 Drug: Rocephin 1 grams Route: IV; Rate: calculated rate; Site: right antecubital;       vg1 

16:09 Follow up: Response: No adverse reaction; IV Status: Completed infusion                 vg1 

14:12 Drug: NS 0.9% 1000 ml Route: IV; Rate: 125 ml/hr; Site: right antecubital;              vg1 

19:00 Follow up: IV Status: Completed infusion                                                vg1 

16:09 Drug: NS 0.9% 500 ml Route: IV; Rate: bolus; Site: right antecubital;                   vg1 

17:20 Follow up: IV Status: Completed infusion; IV Intake: 500ml                              vg1 

16:57 Drug: morphine 4 mg Route: IVP; Site: right antecubital;                                em  

18:59 Follow up: Response: Pain is decreased                                                  vg1 

18:59 Drug: morphine 4 mg {Note: rass 0.} Route: IVP; Site: right antecubital;                vg1 

19:00 Follow up: Response: Medication administered at discharge.                              vg1 

18:59 Drug: Zofran (Ondansetron) 4 mg Route: IVP; Site: right antecubital;                    vg1 

19:00 Follow up: Response: Medication administered at discharge.                              1 

                                                                                                  

                                                                                                  

Disposition:                                                                                      

                                                                                             

14:05 Co-signature as Attending Physician, Kobi Olivia MD I agree with the assessment and   kdr 

      plan of care.                                                                               

                                                                                                  

Disposition:                                                                                      

21 13:48 Transfer ordered to [facility]. Diagnosis are Right     

  renal mass, Right perinephric hematoma, Acute kidney injury, Urinary tract                      

  infection, site not specified.                                                                  

- Reason for transfer: Higher level of care.                                                      

- Accepting physician is [facility].                                               

- Condition is Stable.                                                                            

- Problem is new.                                                                                 

- Symptoms have improved.                                                                         

                                                                                                  

                                                                                                  

                                                                                                  

Signatures:                                                                                       

Dispatcher [employer]                           Kobi Morse MD MD kdr Munoz, Edgar RN                        RN   Jordin Nguyen NP                    NP   pm1                                                  

Almaz Toledo, RN                    RN   vg1                                                  

Queenie Aguirre, RN                       RN   ll1                                                  

                                                                                                  

Corrections: (The following items were deleted from the chart)                                    

                                                                                             

12:27 12:02 Abdomen Pelvis W Con+CT.RAD.BRZ ordered. EDMS                                     EDMS

16:41 16:10 CORONAVIRUS+MR.LAB.BRZ ordered. EDMS                                              EDMS

16:59 13:48 2021 13:48 Transfer ordered to [facility].       pm1 

      Diagnosis is Right renal massRight perinephric hematoma; Acute kidney injury. Reason        

      for transfer: Higher level of care. Accepting physician is [facility].       

      Condition is Stable. Problem is new. Symptoms have improved. pm1                            

19:02 16:59 2021 13:48 Transfer ordered to [facility].       vg1 

      Diagnosis is Right renal massRight perinephric hematoma; Acute kidney injury; Urinary       

      tract infection, site not specified. Reason for transfer: Higher level of care.             

      Accepting physician is [facility]. Condition is Stable. Problem is new.      

      Symptoms have improved. pm1                                                                 

                                                                                                  

**************************************************************************************************